# Patient Record
Sex: MALE | Race: WHITE | NOT HISPANIC OR LATINO | Employment: OTHER | URBAN - NONMETROPOLITAN AREA
[De-identification: names, ages, dates, MRNs, and addresses within clinical notes are randomized per-mention and may not be internally consistent; named-entity substitution may affect disease eponyms.]

---

## 2017-01-09 RX ORDER — TIZANIDINE 4 MG/1
TABLET ORAL
Qty: 90 TAB | Refills: 0 | Status: SHIPPED | OUTPATIENT
Start: 2017-01-09 | End: 2017-02-06 | Stop reason: SDUPTHER

## 2017-02-06 RX ORDER — TIZANIDINE 4 MG/1
4 TABLET ORAL 3 TIMES DAILY PRN
Qty: 90 TAB | Refills: 0 | Status: SHIPPED | OUTPATIENT
Start: 2017-02-06 | End: 2017-03-16 | Stop reason: SDUPTHER

## 2017-03-16 RX ORDER — TIZANIDINE 4 MG/1
4 TABLET ORAL 3 TIMES DAILY PRN
Qty: 90 TAB | Refills: 0 | Status: SHIPPED | OUTPATIENT
Start: 2017-03-16 | End: 2017-04-14 | Stop reason: SDUPTHER

## 2017-04-17 RX ORDER — TIZANIDINE 4 MG/1
4 TABLET ORAL 3 TIMES DAILY PRN
Qty: 90 TAB | Refills: 0 | Status: SHIPPED | OUTPATIENT
Start: 2017-04-17 | End: 2017-05-18 | Stop reason: SDUPTHER

## 2017-05-18 RX ORDER — TIZANIDINE 4 MG/1
4 TABLET ORAL 3 TIMES DAILY PRN
Qty: 90 TAB | Refills: 0 | Status: SHIPPED | OUTPATIENT
Start: 2017-05-18 | End: 2017-06-22 | Stop reason: SDUPTHER

## 2017-06-22 RX ORDER — TIZANIDINE 4 MG/1
4 TABLET ORAL 3 TIMES DAILY PRN
Qty: 90 TAB | Refills: 0 | Status: SHIPPED | OUTPATIENT
Start: 2017-06-22 | End: 2017-09-11 | Stop reason: SDUPTHER

## 2017-07-27 RX ORDER — TIZANIDINE 4 MG/1
4 TABLET ORAL 3 TIMES DAILY PRN
Qty: 90 TAB | Refills: 0 | OUTPATIENT
Start: 2017-07-27

## 2017-08-10 RX ORDER — TIZANIDINE 4 MG/1
4 TABLET ORAL 3 TIMES DAILY PRN
Qty: 90 TAB | Refills: 0 | OUTPATIENT
Start: 2017-08-10

## 2017-09-11 RX ORDER — TIZANIDINE 4 MG/1
4 TABLET ORAL 3 TIMES DAILY PRN
Qty: 90 TAB | Refills: 0 | Status: SHIPPED | OUTPATIENT
Start: 2017-09-11 | End: 2017-10-08 | Stop reason: SDUPTHER

## 2017-09-21 ENCOUNTER — OFFICE VISIT (OUTPATIENT)
Dept: MEDICAL GROUP | Facility: CLINIC | Age: 67
End: 2017-09-21
Payer: MEDICARE

## 2017-09-21 VITALS
HEIGHT: 69 IN | WEIGHT: 141 LBS | BODY MASS INDEX: 20.88 KG/M2 | TEMPERATURE: 98.6 F | SYSTOLIC BLOOD PRESSURE: 112 MMHG | HEART RATE: 89 BPM | RESPIRATION RATE: 14 BRPM | OXYGEN SATURATION: 95 % | DIASTOLIC BLOOD PRESSURE: 62 MMHG

## 2017-09-21 DIAGNOSIS — M54.41 CHRONIC BILATERAL LOW BACK PAIN WITH BILATERAL SCIATICA: ICD-10-CM

## 2017-09-21 DIAGNOSIS — Z23 NEED FOR VACCINATION: ICD-10-CM

## 2017-09-21 DIAGNOSIS — G89.29 CHRONIC BILATERAL LOW BACK PAIN WITH BILATERAL SCIATICA: ICD-10-CM

## 2017-09-21 DIAGNOSIS — N40.1 BENIGN NON-NODULAR PROSTATIC HYPERPLASIA WITH LOWER URINARY TRACT SYMPTOMS: ICD-10-CM

## 2017-09-21 DIAGNOSIS — M54.42 CHRONIC BILATERAL LOW BACK PAIN WITH BILATERAL SCIATICA: ICD-10-CM

## 2017-09-21 PROCEDURE — G0009 ADMIN PNEUMOCOCCAL VACCINE: HCPCS | Performed by: PHYSICIAN ASSISTANT

## 2017-09-21 PROCEDURE — 90715 TDAP VACCINE 7 YRS/> IM: CPT | Performed by: PHYSICIAN ASSISTANT

## 2017-09-21 PROCEDURE — 90670 PCV13 VACCINE IM: CPT | Performed by: PHYSICIAN ASSISTANT

## 2017-09-21 PROCEDURE — 90472 IMMUNIZATION ADMIN EACH ADD: CPT | Performed by: PHYSICIAN ASSISTANT

## 2017-09-21 PROCEDURE — 99212 OFFICE O/P EST SF 10 MIN: CPT | Mod: 25 | Performed by: PHYSICIAN ASSISTANT

## 2017-09-21 RX ORDER — HYDROCODONE BITARTRATE AND ACETAMINOPHEN 5; 325 MG/1; MG/1
1-2 TABLET ORAL EVERY 4 HOURS PRN
COMMUNITY
End: 2017-09-21 | Stop reason: SDUPTHER

## 2017-09-21 RX ORDER — HYDROCODONE BITARTRATE AND ACETAMINOPHEN 5; 325 MG/1; MG/1
1-2 TABLET ORAL EVERY 4 HOURS PRN
Qty: 90 TAB | Refills: 0 | Status: SHIPPED | OUTPATIENT
Start: 2017-09-21

## 2017-09-21 RX ORDER — DICLOFENAC SODIUM 75 MG/1
75 TABLET, DELAYED RELEASE ORAL 2 TIMES DAILY
Qty: 60 TAB | Refills: 2 | Status: SHIPPED | OUTPATIENT
Start: 2017-09-21

## 2017-09-21 ASSESSMENT — PATIENT HEALTH QUESTIONNAIRE - PHQ9: CLINICAL INTERPRETATION OF PHQ2 SCORE: 0

## 2017-09-21 NOTE — PROGRESS NOTES
Chief Complaint   Patient presents with   • Establish Care     back pain       HISTORY OF PRESENT ILLNESS: Patient is a 67 y.o. male established patient who presents today for evaluation and management of:    Back pain  Patient has had low back pain for about  20 years or more. He has been using flexeril and tizanidine for many years without use of opioid however about a week ago his back pain became so bad that he went to [facility] for pain relief. He was given 3 days worth of 5-325 [place] by the ER physician and states that this alleviated his pain well. He was advised that he should follow up with Dr. De Luna at [facility] in [place] for further evaluation. He has not yet made this appointment but will do so in the near future. He is not incontinent of bowel or bladder and has pain down the posterior of bilateral legs with some pain down the anterior portion of his bilateral legs as well.       Patient Active Problem List    Diagnosis Date Noted   • Benign non-nodular prostatic hyperplasia with lower urinary tract symptoms 09/21/2017   • Back pain        Allergies:Baclofen    Current Outpatient Prescriptions   Medication Sig Dispense Refill   • hydrocodone-acetaminophen (NORCO) 5-325 MG Tab per tablet Take 1-2 Tabs by mouth every four hours as needed (for severe back pain). 90 Tab 0   • diclofenac EC (VOLTAREN) 75 MG Tablet Delayed Response Take 1 Tab by mouth 2 times a day. 60 Tab 2   • tizanidine (ZANAFLEX) 4 MG Tab Take 1 Tab by mouth 3 times a day as needed. 90 Tab 0   • tamsulosin (FLOMAX) 0.4 MG capsule Take 0.4 mg by mouth ONE-HALF HOUR AFTER BREAKFAST.       • finasteride (PROSCAR) 5 MG TABS Take 5 mg by mouth every day.         No current facility-administered medications for this visit.        Social History   Substance Use Topics   • Smoking status: Former Smoker     Packs/day: 0.50     Types: Cigars   • Smokeless tobacco: Never Used   • Alcohol use No       Family Status   Relation  "Status   • Mother    • Father    • Sister    • Sister Alive     Family History   Problem Relation Age of Onset   • Alzheimer's Disease Mother    • Other Father      degenerative disc    • Cancer Sister        Review of Systems:   Constitutional: Negative for fever, chills, and malaise/fatigue. Positive for lack of appetite due to pain.     Eyes: Negative for blurred vision.   Respiratory: Negative for cough, sputum production, shortness of breath and wheezing.    Cardiovascular: Negative for chest pain, palpitations, orthopnea and leg swelling.   Gastrointestinal: Negative for heartburn, nausea, vomiting and abdominal pain.   Genitourinary: Negative for dysuria, urgency and frequency.   Musculoskeletal: See history of present illness above. Negative for and joint pain.   Skin: Negative for rash and itching.   Neurological: Negative for dizziness, tingling, tremors, sensory change, focal weakness and headaches.   Endo/Heme/Allergies: Does not bruise/bleed easily.   Psychiatric/Behavioral: Negative for depression, suicidal ideas and memory loss.  The patient is not nervous/anxious and does not have insomnia.      Exam:  Blood pressure 112/62, pulse 89, temperature 37 °C (98.6 °F), resp. rate 14, height 1.753 m (5' 9\"), weight 64 kg (141 lb), SpO2 95 %.  Body mass index is 20.82 kg/m².  General:  Thin and Healthy-Appearing male in NAD  Head: is grossly normal.  Neck: Supple without masses. Thyroid is not visibly enlarged.  Pulmonary: Reduced but present breath sounds in bilateral lower lobes. Normal effort. No rales, ronchi, or wheezing.   Cardiovascular: Regular rate and rhythm without murmur. Carotid and radial pulses are intact and equal bilaterally.  Extremities: no clubbing, cyanosis, or edema.  Musculoskeletal: Reduced range of motion in all directions front, back, side to side in lower spine. Tenderness to palpation midline of lower spine but worse on either side of lower " spine.    Medical decision-making and discussion:  1. Chronic bilateral low back pain with bilateral sciatica  - hydrocodone-acetaminophen (NORCO) 5-325 MG Tab per tablet; Take 1-2 Tabs by mouth every four hours as needed (for severe back pain).  Dispense: 90 Tab; Refill: 0  - diclofenac EC (VOLTAREN) 75 MG Tablet Delayed Response; Take 1 Tab by mouth 2 times a day.  Dispense: 60 Tab; Refill: 2  - REFERRAL TO PAIN CLINIC  - Controlled Substance Treatment Agreement  - [facility] PAIN MANAGEMENT SCREEN; Future    2. Benign non-nodular prostatic hyperplasia with lower urinary tract symptoms  Patient is followed by urology.    3. Need for vaccination  - Tdap =>6yo IM  - Prevnar 13 PCV-13      Please note that this dictation was created using voice recognition software. I have made every reasonable attempt to correct obvious errors, but I expect that there are errors of grammar and possibly content that I did not discover before finalizing the note.      Return if symptoms worsen or fail to improve.

## 2017-09-21 NOTE — ASSESSMENT & PLAN NOTE
Patient has had low back pain for about  20 years or more. He has been using flexeril and tizanidine for many years without use of opioid however about a week ago his back pain became so bad that he went to [facility] for pain relief. He was given 3 days worth of 5-325 [place] by the ER physician and states that this alleviated his pain well. He was advised that he should follow up with Dr. De Luna at [facility] in [place] for further evaluation. He has not yet made this appointment but will do so in the near future. He is not incontinent of bowel or bladder and has pain down the posterior of bilateral legs with some pain down the anterior portion of his bilateral legs as well.

## 2017-10-09 PROBLEM — R89.2 ABNORMAL DRUG SCREEN: Status: ACTIVE | Noted: 2017-10-09

## 2017-10-19 ENCOUNTER — OFFICE VISIT (OUTPATIENT)
Dept: MEDICAL GROUP | Facility: CLINIC | Age: 67
End: 2017-10-19
Payer: MEDICARE

## 2017-10-19 VITALS
HEART RATE: 72 BPM | HEIGHT: 69 IN | WEIGHT: 140 LBS | TEMPERATURE: 98.9 F | SYSTOLIC BLOOD PRESSURE: 132 MMHG | BODY MASS INDEX: 20.73 KG/M2 | OXYGEN SATURATION: 96 % | RESPIRATION RATE: 16 BRPM | DIASTOLIC BLOOD PRESSURE: 72 MMHG

## 2017-10-19 DIAGNOSIS — G89.29 CHRONIC BILATERAL LOW BACK PAIN WITHOUT SCIATICA: ICD-10-CM

## 2017-10-19 DIAGNOSIS — R89.2 ABNORMAL DRUG SCREEN: ICD-10-CM

## 2017-10-19 DIAGNOSIS — M54.50 CHRONIC BILATERAL LOW BACK PAIN WITHOUT SCIATICA: ICD-10-CM

## 2017-10-19 DIAGNOSIS — Z02.89 PAIN MANAGEMENT CONTRACT AGREEMENT: ICD-10-CM

## 2017-10-19 PROCEDURE — 99212 OFFICE O/P EST SF 10 MIN: CPT | Performed by: PHYSICIAN ASSISTANT

## 2017-10-19 NOTE — ASSESSMENT & PLAN NOTE
Patient presents today after having seen a provider and Dr. De Luna's office at [facility] for this problem. He states that the only treatment plan that was discussed with this provider was physical therapy and that medication management was not discussed. The patient somehow did not understand that this was a pain management referral and he was supposed to transfer his pain medication management over to this provider's office. Patient has been using marijuana in order to alleviate daily morning nausea.

## 2017-10-19 NOTE — ASSESSMENT & PLAN NOTE
Positive THC discussed with patient today. He was informed that he is no longer able to receive narcotic medication from this office due to this positive abnormal urine drug screen.

## 2017-10-19 NOTE — PROGRESS NOTES
Chief Complaint   Patient presents with   • Medication Refill       HISTORY OF PRESENT ILLNESS: Patient is a 67 y.o. male established patient who presents today for evaluation and management of:    Back pain  Patient presents today after having seen a provider and Dr. De Luna's office at [facility] for this problem. He states that the only treatment plan that was discussed with this provider was physical therapy and that medication management was not discussed. The patient somehow did not understand that this was a pain management referral and he was supposed to transfer his pain medication management over to this provider's office. Patient has been using marijuana in order to alleviate daily morning nausea.     Abnormal drug screen  Positive THC discussed with patient today. He was informed that he is no longer able to receive narcotic medication from this office due to this positive abnormal urine drug screen.    Pain management contract agreement  Followed by [facility] in [place] for pain management.       Patient Active Problem List    Diagnosis Date Noted   • Pain management contract agreement 10/19/2017   • Abnormal drug screen 10/09/2017   • Benign non-nodular prostatic hyperplasia with lower urinary tract symptoms 09/21/2017   • Back pain        Allergies:Baclofen    Current Outpatient Prescriptions   Medication Sig Dispense Refill   • tizanidine (ZANAFLEX) 4 MG Tab TAKE ONE TABLET BY MOUTH THREE TIMES A DAY AS NEEDED 90 Tab 0   • hydrocodone-acetaminophen (NORCO) 5-325 MG Tab per tablet Take 1-2 Tabs by mouth every four hours as needed (for severe back pain). 90 Tab 0   • diclofenac EC (VOLTAREN) 75 MG Tablet Delayed Response Take 1 Tab by mouth 2 times a day. 60 Tab 2   • tamsulosin (FLOMAX) 0.4 MG capsule Take 0.4 mg by mouth ONE-HALF HOUR AFTER BREAKFAST.       • finasteride (PROSCAR) 5 MG TABS Take 5 mg by mouth every day.         No current facility-administered medications for this  "visit.        Social History   Substance Use Topics   • Smoking status: Former Smoker     Packs/day: 0.50     Types: Cigars   • Smokeless tobacco: Never Used   • Alcohol use No       Family Status   Relation Status   • Mother    • Father    • Sister    • Sister Alive     Family History   Problem Relation Age of Onset   • Alzheimer's Disease Mother    • Other Father      degenerative disc    • Cancer Sister        Review of Systems:   Constitutional: Negative for fever, chills, weight loss and malaise/fatigue.    Respiratory: Negative for  shortness of breath.    Cardiovascular: Negative for chest pain  Gastrointestinal: Positive for daily morning  Nausea for many months. Negative for heartburn, vomiting and abdominal pain.   Musculoskeletal: Positive for back pain and joint pain.     Exam:  Blood pressure 132/72, pulse 72, temperature 37.2 °C (98.9 °F), resp. rate 16, height 1.753 m (5' 9\"), weight 63.5 kg (140 lb), SpO2 96 %.  Body mass index is 20.67 kg/m².  General:  Well Developed, Well Nourished male in NAD  Head: is grossly normal.  Neck: Thyroid is not visibly enlarged.  Pulmonary: Normal effort.  Cardiovascular: Radial pulses are intact and equal bilaterally.  Extremities: no clubbing, cyanosis, or edema.  Musculoskeletal: stiff gait without normal arm swing and stride.     Medical decision-making and discussion:  1. Chronic bilateral low back pain without sciatica  Patient advised to continue seeing [facility] for this problem. He was advised that when he presented for this visit he was supposed to mention his medication management, as this was a pain management referral. He was advised to call this office and make a follow-up appointment in order to receive pain medications, should they decide to fill these for him.    2. Abnormal drug screen  Patient was advised the simultaneous use of narcotic prescribed medications and federally illegal drugs is not allowed in this practice " and that he must stop using what    3. Pain management contract agreement  See above.    Regarding health maintenance: Patient was advised that he can get his vaccinations today however, after the news that he would no longer be receiving [place] from this office he left without a chance to administer these vaccines.      Please note that this dictation was created using voice recognition software. I have made every reasonable attempt to correct obvious errors, but I expect that there are errors of grammar and possibly content that I did not discover before finalizing the note.      Return for Chronic conditions.

## 2017-11-07 RX ORDER — TIZANIDINE 4 MG/1
TABLET ORAL
Qty: 90 TAB | Refills: 0 | Status: SHIPPED | OUTPATIENT
Start: 2017-11-07 | End: 2017-12-12 | Stop reason: SDUPTHER

## 2017-12-12 RX ORDER — TIZANIDINE 4 MG/1
4 TABLET ORAL 3 TIMES DAILY PRN
Qty: 90 TAB | Refills: 0 | Status: SHIPPED | OUTPATIENT
Start: 2017-12-12 | End: 2018-01-11 | Stop reason: SDUPTHER

## 2017-12-12 NOTE — TELEPHONE ENCOUNTER
Was the patient seen in the last year in this department? Yes - Kamila León.     Does patient have an active prescription for medications requested? Yes     Received Request Via: Pharmacy

## 2018-01-15 RX ORDER — TIZANIDINE 4 MG/1
4 TABLET ORAL 3 TIMES DAILY PRN
Qty: 90 TAB | Refills: 0 | Status: SHIPPED | OUTPATIENT
Start: 2018-01-15 | End: 2018-02-12 | Stop reason: SDUPTHER

## 2018-02-13 RX ORDER — TIZANIDINE 4 MG/1
TABLET ORAL
Qty: 90 TAB | Refills: 0 | Status: SHIPPED | OUTPATIENT
Start: 2018-02-13 | End: 2018-03-14 | Stop reason: SDUPTHER